# Patient Record
Sex: FEMALE | Race: OTHER | HISPANIC OR LATINO | URBAN - METROPOLITAN AREA
[De-identification: names, ages, dates, MRNs, and addresses within clinical notes are randomized per-mention and may not be internally consistent; named-entity substitution may affect disease eponyms.]

---

## 2018-07-23 PROBLEM — M79.2 NEUROPATHIC PAIN: Status: ACTIVE | Noted: 2017-06-21

## 2020-02-14 PROBLEM — G43.909 MIGRAINES: Status: ACTIVE | Noted: 2017-04-20

## 2020-02-14 PROBLEM — J34.89 NASAL OBSTRUCTION: Status: ACTIVE | Noted: 2019-04-10

## 2020-02-14 PROBLEM — M95.2 ACQUIRED FACIAL DEFORMITY: Status: ACTIVE | Noted: 2019-04-10

## 2021-08-20 PROBLEM — I78.1 TELANGIECTASIA: Status: ACTIVE | Noted: 2021-08-20

## 2021-09-24 PROBLEM — L90.5 SCAR: Status: ACTIVE | Noted: 2021-08-22

## 2024-05-03 PROBLEM — R87.611 ATYPICAL SQUAMOUS CELLS CANNOT EXCLUDE HIGH GRADE SQUAMOUS INTRAEPITHELIAL LESION ON CYTOLOGIC SMEAR OF CERVIX (ASC-H): Status: ACTIVE | Noted: 2024-05-03

## 2024-05-05 PROBLEM — Z78.9 SOCIAL ALCOHOL USE: Status: ACTIVE | Noted: 2024-05-03

## 2024-05-05 PROBLEM — Z86.79 HISTORY OF HYPERTENSION: Status: RESOLVED | Noted: 2024-05-03 | Resolved: 2024-05-05

## 2024-05-05 PROBLEM — Z80.9 FAMILY HISTORY OF MALIGNANT NEOPLASM: Status: ACTIVE | Noted: 2024-05-05

## 2024-05-05 PROBLEM — R10.9 ABDOMINAL DISCOMFORT: Status: ACTIVE | Noted: 2024-05-03

## 2024-05-23 PROBLEM — Z09 ENCOUNTER FOR FOLLOW-UP: Status: ACTIVE | Noted: 2019-11-05

## 2024-05-27 PROBLEM — N87.9 CERVICAL DYSPLASIA: Status: ACTIVE | Noted: 2024-05-23

## 2024-07-08 ENCOUNTER — OUTPATIENT (OUTPATIENT)
Dept: OUTPATIENT SERVICES | Facility: HOSPITAL | Age: 49
LOS: 1 days | End: 2024-07-08
Payer: MEDICAID

## 2024-07-08 VITALS
HEART RATE: 84 BPM | HEIGHT: 62 IN | OXYGEN SATURATION: 97 % | TEMPERATURE: 98 F | DIASTOLIC BLOOD PRESSURE: 93 MMHG | RESPIRATION RATE: 18 BRPM | SYSTOLIC BLOOD PRESSURE: 145 MMHG | WEIGHT: 197.09 LBS

## 2024-07-08 DIAGNOSIS — R87.611 ATYPICAL SQUAMOUS CELLS CANNOT EXCLUDE HIGH GRADE SQUAMOUS INTRAEPITHELIAL LESION ON CYTOLOGIC SMEAR OF CERVIX (ASC-H): ICD-10-CM

## 2024-07-08 DIAGNOSIS — Z01.818 ENCOUNTER FOR OTHER PREPROCEDURAL EXAMINATION: ICD-10-CM

## 2024-07-08 DIAGNOSIS — D48.9 NEOPLASM OF UNCERTAIN BEHAVIOR, UNSPECIFIED: Chronic | ICD-10-CM

## 2024-07-08 DIAGNOSIS — I10 ESSENTIAL (PRIMARY) HYPERTENSION: ICD-10-CM

## 2024-07-08 DIAGNOSIS — Z98.890 OTHER SPECIFIED POSTPROCEDURAL STATES: Chronic | ICD-10-CM

## 2024-07-08 DIAGNOSIS — Z98.89 UNDEFINED: Chronic | ICD-10-CM

## 2024-07-08 LAB
ANION GAP SERPL CALC-SCNC: 7 MMOL/L — SIGNIFICANT CHANGE UP (ref 5–17)
APTT BLD: 34.6 SEC — SIGNIFICANT CHANGE UP (ref 24.5–35.6)
BLD GP AB SCN SERPL QL: SIGNIFICANT CHANGE UP
BUN SERPL-MCNC: 12 MG/DL — SIGNIFICANT CHANGE UP (ref 7–18)
CALCIUM SERPL-MCNC: 9.3 MG/DL — SIGNIFICANT CHANGE UP (ref 8.4–10.5)
CHLORIDE SERPL-SCNC: 106 MMOL/L — SIGNIFICANT CHANGE UP (ref 96–108)
CO2 SERPL-SCNC: 25 MMOL/L — SIGNIFICANT CHANGE UP (ref 22–31)
CREAT SERPL-MCNC: 0.77 MG/DL — SIGNIFICANT CHANGE UP (ref 0.5–1.3)
EGFR: 94 ML/MIN/1.73M2 — SIGNIFICANT CHANGE UP
GLUCOSE SERPL-MCNC: 147 MG/DL — HIGH (ref 70–99)
HCT VFR BLD CALC: 38.2 % — SIGNIFICANT CHANGE UP (ref 34.5–45)
HGB BLD-MCNC: 12.6 G/DL — SIGNIFICANT CHANGE UP (ref 11.5–15.5)
INR BLD: 0.9 RATIO — SIGNIFICANT CHANGE UP (ref 0.85–1.18)
MCHC RBC-ENTMCNC: 29.5 PG — SIGNIFICANT CHANGE UP (ref 27–34)
MCHC RBC-ENTMCNC: 33 GM/DL — SIGNIFICANT CHANGE UP (ref 32–36)
MCV RBC AUTO: 89.5 FL — SIGNIFICANT CHANGE UP (ref 80–100)
NRBC # BLD: 0 /100 WBCS — SIGNIFICANT CHANGE UP (ref 0–0)
PLATELET # BLD AUTO: 291 K/UL — SIGNIFICANT CHANGE UP (ref 150–400)
POTASSIUM SERPL-MCNC: 3.5 MMOL/L — SIGNIFICANT CHANGE UP (ref 3.5–5.3)
POTASSIUM SERPL-SCNC: 3.5 MMOL/L — SIGNIFICANT CHANGE UP (ref 3.5–5.3)
PROTHROM AB SERPL-ACNC: 10.3 SEC — SIGNIFICANT CHANGE UP (ref 9.5–13)
RBC # BLD: 4.27 M/UL — SIGNIFICANT CHANGE UP (ref 3.8–5.2)
RBC # FLD: 12.6 % — SIGNIFICANT CHANGE UP (ref 10.3–14.5)
SODIUM SERPL-SCNC: 138 MMOL/L — SIGNIFICANT CHANGE UP (ref 135–145)
WBC # BLD: 11.9 K/UL — HIGH (ref 3.8–10.5)
WBC # FLD AUTO: 11.9 K/UL — HIGH (ref 3.8–10.5)

## 2024-07-08 PROCEDURE — 93010 ELECTROCARDIOGRAM REPORT: CPT

## 2024-07-08 NOTE — H&P PST ADULT - ATTENDING COMMENTS
Patient seen in [facility] with her mother. No changes reported. Planned procedure discussed, including EUA by learner. Consent form reviewed. All questions answered. Case discussed with OR circulating RN.    Peg Bhatti MD

## 2024-07-08 NOTE — H&P PST ADULT - NS PRO FEM REPRO PAP ABN RESULTS
COPD/PN Week 3 Survey      Responses   Facility patient discharged from?  [facility]   Does the patient have one of the following disease processes/diagnoses(primary or secondary)?  COPD/Pneumonia   Was the primary reason for admission:  COPD exacerbation   Week 3 attempt successful?  No   Unsuccessful attempts  Attempt 1          Maggie Hutson, RN  
July 2024/atypical squamous cells (cannot exclude high grade lesion)

## 2024-07-08 NOTE — H&P PST ADULT - SKIN
Please order repeat stat CMP and CBC and UA. She did not look dehydrated on labs on 12/6 but we can repeat. I am happy to give her some time off work. If her symptoms are worsening it is certainly not wrong to seek higher level of care   warm and dry/color normal/no rashes/no ulcers/scars

## 2024-07-08 NOTE — H&P PST ADULT - NSICDXPASTMEDICALHX_GEN_ALL_CORE_FT
PAST MEDICAL HISTORY:  Basal cell carcinoma of skin     Cholecystitis     Hypertension     Malignant neoplasm face

## 2024-07-08 NOTE — H&P PST ADULT - HISTORY OF PRESENT ILLNESS
49year old female with pmhx of hypertension, cholecystitis and skin carcinoma to face in 2014 presents with c/o abnormal/precancerous cervical cell found on pap smear done last month. Patient is here today for presurgical testing for scheduled examination under anesthesia cold knife biopsy with endometrial curettage on 7/18/2024

## 2024-07-08 NOTE — H&P PST ADULT - PROBLEM SELECTOR PLAN 2
Current treatment regimen - Losartan/Hctz 100/12.5mg daily   Advised to continue current regimen   Patient advised with understanding verbalized to take Losartan/Hctz with a sip of water the morning of surgery  Follow up with PCP/Cardiologist postoperatively    EKG done today -  NSR. Copy of EKG and medical optimization form given to patient for PCP medical optimization appt.

## 2024-07-08 NOTE — H&P PST ADULT - PROBLEM SELECTOR PLAN 1
Patient is scheduled for examination under anesthesia, cold knife biopsy with endometrial curettage on 7/18/2024  Written and oral preoperative instructions given to patient with understanding verbalized.    Instructions given to include using 4% chlorhexidine wash as directed starting 3days before day of surgery (inclusive of day of surgery)   Maintaining NPO status post midnight day before surgery   Stopping aspirin, NSAIDs, herbs, vitamins 7days before surgery    Patient is to expect a phone call day before surgery between 430-630pm, giving arrival time for surgery day.     Patient today with STOP Bang Score of 1, Low Risk for VIRGEN   Preoperative labs drawn today, results pending. Results to be faxed to PCP for optimization

## 2024-07-09 PROBLEM — I10 ESSENTIAL (PRIMARY) HYPERTENSION: Chronic | Status: ACTIVE | Noted: 2024-07-08

## 2024-07-09 PROBLEM — C44.91 BASAL CELL CARCINOMA OF SKIN, UNSPECIFIED: Chronic | Status: ACTIVE | Noted: 2024-07-08

## 2024-07-09 PROBLEM — K81.9 CHOLECYSTITIS, UNSPECIFIED: Chronic | Status: ACTIVE | Noted: 2024-07-08

## 2024-07-09 PROCEDURE — 36415 COLL VENOUS BLD VENIPUNCTURE: CPT

## 2024-07-09 PROCEDURE — 80048 BASIC METABOLIC PNL TOTAL CA: CPT

## 2024-07-09 PROCEDURE — 93005 ELECTROCARDIOGRAM TRACING: CPT

## 2024-07-09 PROCEDURE — 86850 RBC ANTIBODY SCREEN: CPT

## 2024-07-09 PROCEDURE — 86900 BLOOD TYPING SEROLOGIC ABO: CPT

## 2024-07-09 PROCEDURE — 85027 COMPLETE CBC AUTOMATED: CPT

## 2024-07-09 PROCEDURE — 85610 PROTHROMBIN TIME: CPT

## 2024-07-09 PROCEDURE — G0463: CPT

## 2024-07-09 PROCEDURE — 86901 BLOOD TYPING SEROLOGIC RH(D): CPT

## 2024-07-09 PROCEDURE — 85730 THROMBOPLASTIN TIME PARTIAL: CPT

## 2024-07-18 ENCOUNTER — OUTPATIENT (OUTPATIENT)
Dept: OUTPATIENT SERVICES | Facility: HOSPITAL | Age: 49
LOS: 1 days | End: 2024-07-18
Payer: MEDICAID

## 2024-07-18 VITALS
WEIGHT: 197.09 LBS | TEMPERATURE: 98 F | HEIGHT: 62 IN | HEART RATE: 64 BPM | SYSTOLIC BLOOD PRESSURE: 105 MMHG | OXYGEN SATURATION: 97 % | RESPIRATION RATE: 16 BRPM | DIASTOLIC BLOOD PRESSURE: 71 MMHG

## 2024-07-18 VITALS
HEART RATE: 59 BPM | SYSTOLIC BLOOD PRESSURE: 116 MMHG | DIASTOLIC BLOOD PRESSURE: 77 MMHG | RESPIRATION RATE: 16 BRPM | TEMPERATURE: 98 F | OXYGEN SATURATION: 97 %

## 2024-07-18 DIAGNOSIS — Z98.89 OTHER SPECIFIED POSTPROCEDURAL STATES: Chronic | ICD-10-CM

## 2024-07-18 DIAGNOSIS — R87.611 ATYPICAL SQUAMOUS CELLS CANNOT EXCLUDE HIGH GRADE SQUAMOUS INTRAEPITHELIAL LESION ON CYTOLOGIC SMEAR OF CERVIX (ASC-H): ICD-10-CM

## 2024-07-18 DIAGNOSIS — Z98.890 OTHER SPECIFIED POSTPROCEDURAL STATES: Chronic | ICD-10-CM

## 2024-07-18 LAB
BLD GP AB SCN SERPL QL: SIGNIFICANT CHANGE UP
HCG UR QL: NEGATIVE — SIGNIFICANT CHANGE UP

## 2024-07-18 PROCEDURE — 57520 CONIZATION OF CERVIX: CPT

## 2024-07-18 PROCEDURE — 86850 RBC ANTIBODY SCREEN: CPT

## 2024-07-18 PROCEDURE — 88307 TISSUE EXAM BY PATHOLOGIST: CPT

## 2024-07-18 PROCEDURE — 86901 BLOOD TYPING SEROLOGIC RH(D): CPT

## 2024-07-18 PROCEDURE — 88305 TISSUE EXAM BY PATHOLOGIST: CPT

## 2024-07-18 PROCEDURE — 57522 CONIZATION OF CERVIX: CPT

## 2024-07-18 PROCEDURE — 36415 COLL VENOUS BLD VENIPUNCTURE: CPT

## 2024-07-18 PROCEDURE — 88307 TISSUE EXAM BY PATHOLOGIST: CPT | Mod: 26

## 2024-07-18 PROCEDURE — 86900 BLOOD TYPING SEROLOGIC ABO: CPT

## 2024-07-18 PROCEDURE — 81025 URINE PREGNANCY TEST: CPT

## 2024-07-18 PROCEDURE — 88305 TISSUE EXAM BY PATHOLOGIST: CPT | Mod: 26

## 2024-07-18 PROCEDURE — C1889: CPT

## 2024-07-18 DEVICE — SURGCEL 4 X 8": Type: IMPLANTABLE DEVICE | Status: FUNCTIONAL

## 2024-07-18 RX ORDER — DEXTROSE MONOHYDRATE AND SODIUM CHLORIDE 5; .3 G/100ML; G/100ML
1000 INJECTION, SOLUTION INTRAVENOUS
Refills: 0 | Status: DISCONTINUED | OUTPATIENT
Start: 2024-07-18 | End: 2024-07-18

## 2024-07-18 RX ORDER — LOSARTAN/HYDROCHLOROTHIAZIDE 100MG-25MG
1 TABLET ORAL
Refills: 0 | DISCHARGE

## 2024-07-18 RX ORDER — OXYCODONE HYDROCHLORIDE 100 MG/5ML
5 SOLUTION ORAL ONCE
Refills: 0 | Status: DISCONTINUED | OUTPATIENT
Start: 2024-07-18 | End: 2024-07-18

## 2024-07-18 RX ORDER — ACETAMINOPHEN 325 MG
3 TABLET ORAL
Qty: 0 | Refills: 0 | DISCHARGE

## 2024-07-18 RX ORDER — SODIUM CHLORIDE 0.9 % (FLUSH) 0.9 %
3 SYRINGE (ML) INJECTION EVERY 8 HOURS
Refills: 0 | Status: DISCONTINUED | OUTPATIENT
Start: 2024-07-18 | End: 2024-07-18

## 2024-07-18 RX ORDER — HYDROMORPHONE HCL 0.2 MG/ML
0.5 INJECTION, SOLUTION INTRAVENOUS
Refills: 0 | Status: DISCONTINUED | OUTPATIENT
Start: 2024-07-18 | End: 2024-07-18

## 2024-07-18 RX ORDER — ONDANSETRON HYDROCHLORIDE 2 MG/ML
4 INJECTION INTRAMUSCULAR; INTRAVENOUS ONCE
Refills: 0 | Status: DISCONTINUED | OUTPATIENT
Start: 2024-07-18 | End: 2024-07-18

## 2024-07-18 RX ORDER — FENTANYL CITRATE 50 UG/ML
25 INJECTION, SOLUTION INTRAMUSCULAR; INTRAVENOUS
Refills: 0 | Status: DISCONTINUED | OUTPATIENT
Start: 2024-07-18 | End: 2024-07-18

## 2024-07-18 RX ORDER — DEXTROSE MONOHYDRATE AND SODIUM CHLORIDE 5; .3 G/100ML; G/100ML
1000 INJECTION, SOLUTION INTRAVENOUS ONCE
Refills: 0 | Status: COMPLETED | OUTPATIENT
Start: 2024-07-18 | End: 2024-07-18

## 2024-07-18 RX ORDER — FENTANYL CITRATE 50 UG/ML
50 INJECTION, SOLUTION INTRAMUSCULAR; INTRAVENOUS
Refills: 0 | Status: DISCONTINUED | OUTPATIENT
Start: 2024-07-18 | End: 2024-07-18

## 2024-07-18 RX ADMIN — HYDROMORPHONE HCL 0.5 MILLIGRAM(S): 0.2 INJECTION, SOLUTION INTRAVENOUS at 12:41

## 2024-07-18 RX ADMIN — HYDROMORPHONE HCL 0.5 MILLIGRAM(S): 0.2 INJECTION, SOLUTION INTRAVENOUS at 12:56

## 2024-07-18 RX ADMIN — FENTANYL CITRATE 25 MICROGRAM(S): 50 INJECTION, SOLUTION INTRAMUSCULAR; INTRAVENOUS at 11:17

## 2024-07-18 RX ADMIN — FENTANYL CITRATE 25 MICROGRAM(S): 50 INJECTION, SOLUTION INTRAMUSCULAR; INTRAVENOUS at 11:32

## 2024-07-18 RX ADMIN — FENTANYL CITRATE 50 MICROGRAM(S): 50 INJECTION, SOLUTION INTRAMUSCULAR; INTRAVENOUS at 11:56

## 2024-07-18 RX ADMIN — HYDROMORPHONE HCL 0.5 MILLIGRAM(S): 0.2 INJECTION, SOLUTION INTRAVENOUS at 12:26

## 2024-07-18 RX ADMIN — FENTANYL CITRATE 50 MICROGRAM(S): 50 INJECTION, SOLUTION INTRAMUSCULAR; INTRAVENOUS at 11:41

## 2024-07-18 RX ADMIN — DEXTROSE MONOHYDRATE AND SODIUM CHLORIDE 1000 MILLILITER(S): 5; .3 INJECTION, SOLUTION INTRAVENOUS at 12:24

## 2024-07-18 RX ADMIN — FENTANYL CITRATE 50 MICROGRAM(S): 50 INJECTION, SOLUTION INTRAMUSCULAR; INTRAVENOUS at 12:11

## 2024-07-18 RX ADMIN — OXYCODONE HYDROCHLORIDE 5 MILLIGRAM(S): 100 SOLUTION ORAL at 14:50

## 2024-07-18 RX ADMIN — OXYCODONE HYDROCHLORIDE 5 MILLIGRAM(S): 100 SOLUTION ORAL at 15:45

## 2024-07-18 NOTE — ASU DISCHARGE PLAN (ADULT/PEDIATRIC) - ASU DC SPECIAL INSTRUCTIONSFT
Postoperative Instructions    For pain control, take the followin. Ibuprofen 600mg every 6 hours, take with food  2. Add Tylenol 975mg every 6 hours, alternated with ibuprofen  Tylenol and ibuprofen may be obtained over the counter.    Return to your regular way of eating.     Complete vaginal rest, no tampons, no douching, no tub bathing, no sexual activities for 4 weeks unless otherwise instructed by your doctor.      No driving while on narcotic pain medication.      Call your doctor with any signs and symptoms of infection such as fever (>100.4 F), chills, nausea or vomiting.  Call your doctor if you're unable to tolerate food or have difficulty urinating.  Call your doctor if you have pain that is not relieved by your prescribed medications. Call your doctor with redness or swelling at the incision site, fluid leakage or wound separation.    Notify your doctor with any other concerns. Follow up with your doctor in 2 weeks for a post-operative appointment.

## 2024-07-18 NOTE — PROVIDER CONTACT NOTE (CHANGE IN STATUS NOTIFICATION) - BACKGROUND
s/p EXAMINATION UNDER ANESTHESIA INCLUDING BY LEARNER, CERVICAL CONIZATION, ENDOCERVICAL CURETTAGE, received general anesthesia
s/p EXAMINATION UNDER ANESTHESIA INCLUDING BY LEARNER, CERVICAL CONIZATION, ENDOCERVICAL CURETTAGE, received general anesthesia

## 2024-07-18 NOTE — ASU DISCHARGE PLAN (ADULT/PEDIATRIC) - CARE PROVIDER_API CALL
Peg Bhatti  Gynecologic Oncology  [address] [place] [id number]  Phone: [phone]  Fax: [phone]  Follow Up Time: 2 weeks

## 2024-07-18 NOTE — PROVIDER CONTACT NOTE (CHANGE IN STATUS NOTIFICATION) - SITUATION
pressure pain in the suprapubic 7/10
patient received IV acetaminophen 1g and toradol 30mg intraop and fentanyl 125mcgs fentanyl IVP and patient still have a pressure pain suprapubic of 7/10

## 2024-07-18 NOTE — BRIEF OPERATIVE NOTE - NSICDXBRIEFPREOP_GEN_ALL_CORE_FT
PRE-OP DIAGNOSIS:  High grade squamous intraepithelial cervical dysplasia 18-Jul-2024 10:59:31  Nedra Gonzalez

## 2024-07-18 NOTE — BRIEF OPERATIVE NOTE - NSICDXBRIEFPOSTOP_GEN_ALL_CORE_FT
POST-OP DIAGNOSIS:  High grade squamous intraepithelial cervical dysplasia 18-Jul-2024 10:59:36  Nedra Gonzalez

## 2024-07-18 NOTE — BRIEF OPERATIVE NOTE - OPERATION/FINDINGS
EUA, Cervical dysplastic tissue noted around cervical os after application of acetic acid  Good hemostasis noted at the end of case EUA, Cervical dysplastic tissue noted around cervical os after application of acetic acid  Good hemostasis noted at the end of case  Rectovaginal exam wnl

## 2024-07-18 NOTE — PROVIDER CONTACT NOTE (CHANGE IN STATUS NOTIFICATION) - ASSESSMENT
no vaginal bleeding, no distention, soft lower quadrant, bladder scanner showed 180mls
pain 7/10 like pressure pain

## 2024-07-22 PROBLEM — R39.9 UTI SYMPTOMS: Status: ACTIVE | Noted: 2024-07-22

## 2024-07-22 LAB — SURGICAL PATHOLOGY STUDY: SIGNIFICANT CHANGE UP

## 2024-08-06 PROBLEM — N73.0 PID (ACUTE PELVIC INFLAMMATORY DISEASE): Status: ACTIVE | Noted: 2024-08-06

## 2024-11-05 ENCOUNTER — APPOINTMENT (OUTPATIENT)
Dept: GYNECOLOGIC ONCOLOGY | Facility: CLINIC | Age: 49
End: 2024-11-05

## 2024-11-05 DIAGNOSIS — N87.9 DYSPLASIA OF CERVIX UTERI, UNSPECIFIED: ICD-10-CM

## 2025-06-08 NOTE — BRIEF OPERATIVE NOTE - COMMENTS
Problem: At Risk for Falls  Goal: Patient does not fall  Outcome: Monitoring/Evaluating progress  Goal: Patient takes action to control fall-related risks  Outcome: Monitoring/Evaluating progress     Problem: At Risk for Injury Due to Fall  Goal: Patient does not fall  Outcome: Monitoring/Evaluating progress  Goal: Takes action to control condition specific risks  Outcome: Monitoring/Evaluating progress  Goal: Verbalizes understanding of fall-related injury personal risks  Description: Document education using the patient education activity  Outcome: Monitoring/Evaluating progress     Problem: Pain  Goal: Acceptable pain level achieved/maintained at rest using appropriate pain scale for the patient  Outcome: Monitoring/Evaluating progress  Goal: Acceptable pain level achieved/maintained with activity using appropriate pain scale for the patient  Outcome: Monitoring/Evaluating progress  Goal: Acceptable pain level achieved/maintained without oversedation  Outcome: Monitoring/Evaluating progress     Problem: Impaired Physical Mobility  Goal: Functional status is maintained or returned to baseline during hospitalization  Outcome: Monitoring/Evaluating progress  Goal: Tolerates activity for discharge setting with no abnormal symptoms  Outcome: Monitoring/Evaluating progress     Problem: Skin Integrity Alteration  Goal: Skin remains intact with no new/deterioration of wound or pressure injury  Outcome: Monitoring/Evaluating progress  Goal: Participates in wound care activities  Outcome: Monitoring/Evaluating progress  Problem: At Risk for Falls  Goal: Patient does not fall  Outcome: Monitoring/Evaluating progress  Goal: Patient takes action to control fall-related risks  Outcome: Monitoring/Evaluating progress     Problem: At Risk for Injury Due to Fall  Goal: Patient does not fall  Outcome: Monitoring/Evaluating progress  Goal: Takes action to control condition specific risks  Outcome: Monitoring/Evaluating  progress  Goal: Verbalizes understanding of fall-related injury personal risks  Description: Document education using the patient education activity  Outcome: Monitoring/Evaluating progress     Problem: Pain  Goal: Acceptable pain level achieved/maintained at rest using appropriate pain scale for the patient  Outcome: Monitoring/Evaluating progress  Goal: Acceptable pain level achieved/maintained with activity using appropriate pain scale for the patient  Outcome: Monitoring/Evaluating progress  Goal: Acceptable pain level achieved/maintained without oversedation  Outcome: Monitoring/Evaluating progress     Problem: Impaired Physical Mobility  Goal: Functional status is maintained or returned to baseline during hospitalization  Outcome: Monitoring/Evaluating progress  Goal: Tolerates activity for discharge setting with no abnormal symptoms  Outcome: Monitoring/Evaluating progress     Problem: Skin Integrity Alteration  Goal: Skin remains intact with no new/deterioration of wound or pressure injury  Outcome: Monitoring/Evaluating progress  Goal: Participates in wound care activities  Outcome: Monitoring/Evaluating progress   Continued meds as scheduled.  Will continue encourage increased activity.  Pain medications administered as scheduled and as needed.  [facility] care on.  Will continue to monitor pt and will keep well informed.      Ancef and Flagyl given intraop  dictation by Dr. Tanner

## (undated) DEVICE — SOL IRR POUR H2O 500ML

## (undated) DEVICE — FOR-ESU VALLEYLAB T7E14982DX: Type: DURABLE MEDICAL EQUIPMENT

## (undated) DEVICE — VENODYNE/SCD SLEEVE CALF MEDIUM

## (undated) DEVICE — ELCTR GROUNDING PAD ADULT COVIDIEN

## (undated) DEVICE — SUT SILK 3-0 30" SH

## (undated) DEVICE — ELCTR BOVIE PENCIL SMOKE EVACUATION

## (undated) DEVICE — GOWN XL

## (undated) DEVICE — TUBING SUCTION NONCONDUCTIVE 6MM X 12FT

## (undated) DEVICE — PACK MINOR NO DRAPE

## (undated) DEVICE — PACK LITHOTOMY

## (undated) DEVICE — DRAPE LIGHT HANDLE COVER (BLUE)

## (undated) DEVICE — SUCTION YANKAUER OPEN TIP NO VENT CURVE

## (undated) DEVICE — SUT VICRYL PLUS 0 27" UR-6

## (undated) DEVICE — BLADE SURGICAL #11 CARBON

## (undated) DEVICE — SOL IRR POUR NS 0.9% 1500ML

## (undated) DEVICE — LAP PAD W RING 18 X 18"

## (undated) DEVICE — ELCTR E/S BALL MED 3MMX13CM

## (undated) DEVICE — BASIN SET SINGLE

## (undated) DEVICE — PACK PERI GYN

## (undated) DEVICE — ELCTR BOVIE PENCIL BLADE 10FT

## (undated) DEVICE — GLV 6.5 PROTEXIS (WHITE)

## (undated) DEVICE — APPLICATOR Q TIP 6" WOOD STEM

## (undated) DEVICE — WARMING BLANKET UPPER ADULT

## (undated) DEVICE — SPECULUM VAGINAL MED DISP

## (undated) DEVICE — Device

## (undated) DEVICE — SUCTION YANKAUER NO CONTROL VENT

## (undated) DEVICE — SUT VICRYL PLUS 2-0 27" SH UNDYED

## (undated) DEVICE — DRAPE LEGGINGS 6" CUFF 28X43"

## (undated) DEVICE — TRAP SPECIMEN SPUTUM 40CC